# Patient Record
Sex: FEMALE | Race: WHITE
[De-identification: names, ages, dates, MRNs, and addresses within clinical notes are randomized per-mention and may not be internally consistent; named-entity substitution may affect disease eponyms.]

---

## 2018-08-17 ENCOUNTER — HOSPITAL ENCOUNTER (EMERGENCY)
Dept: HOSPITAL 25 - ED | Age: 28
Discharge: HOME | End: 2018-08-17
Payer: SELF-PAY

## 2018-08-17 VITALS — SYSTOLIC BLOOD PRESSURE: 121 MMHG | DIASTOLIC BLOOD PRESSURE: 60 MMHG
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
## 2018-08-17 DIAGNOSIS — M54.5: Primary | ICD-10-CM

## 2018-08-17 LAB
BASOPHILS # BLD AUTO: 0 10^3/UL (ref 0–0.2)
EOSINOPHIL # BLD AUTO: 0.1 10^3/UL (ref 0–0.6)
HCT VFR BLD AUTO: 36 % (ref 35–47)
HGB BLD-MCNC: 11.7 G/DL (ref 12–16)
LYMPHOCYTES # BLD AUTO: 1.3 10^3/UL (ref 1–4.8)
MCH RBC QN AUTO: 26 PG (ref 27–31)
MCHC RBC AUTO-ENTMCNC: 32 G/DL (ref 31–36)
MCV RBC AUTO: 82 FL (ref 80–97)
MONOCYTES # BLD AUTO: 0.4 10^3/UL (ref 0–0.8)
NEUTROPHILS # BLD AUTO: 2.7 10^3/UL (ref 1.5–7.7)
NRBC # BLD AUTO: 0 10^3/UL
NRBC BLD QL AUTO: 0.1
PLATELET # BLD AUTO: 263 10^3/UL (ref 150–450)
RBC # BLD AUTO: 4.44 10^6/UL (ref 4–5.4)
WBC # BLD AUTO: 4.5 10^3/UL (ref 3.5–10.8)

## 2018-08-17 PROCEDURE — 80053 COMPREHEN METABOLIC PANEL: CPT

## 2018-08-17 PROCEDURE — 72170 X-RAY EXAM OF PELVIS: CPT

## 2018-08-17 PROCEDURE — 72220 X-RAY EXAM SACRUM TAILBONE: CPT

## 2018-08-17 PROCEDURE — 99282 EMERGENCY DEPT VISIT SF MDM: CPT

## 2018-08-17 PROCEDURE — 84702 CHORIONIC GONADOTROPIN TEST: CPT

## 2018-08-17 PROCEDURE — 85025 COMPLETE CBC W/AUTO DIFF WBC: CPT

## 2018-08-17 PROCEDURE — 36415 COLL VENOUS BLD VENIPUNCTURE: CPT

## 2018-08-17 NOTE — ED
Back Pain





- HPI Summary


HPI Summary: 


[facility] attending Aileen Sharpe.





Patient is a 27 y/o F c/o back pain onset ~3 days ago s/p falling down stairs. 

Pain is localized in the lower back, worse on the sides and rated an 8/10, per 

triage. Assoc. Sx: lower back pain. Denies: fever. She reports having fallen 

down the stairs 3 days ago. She slid down 15 stairs in a seated position, her 

coccyx taking the majority of the trauma. She is unable to sit on her tail bone 

secondary to pain. She reports using ice to alleviate the swelling. LNMP: 2 

weeks ago. Patient has a Hx of Von Willebrand disease.





I, Dr. Sharpe, personally performed the services described in this documentation 

as scribed in my presence and it is both accurate and complete.





- History of Current Complaint


Chief Complaint: EDBackInjuryPain


Stated Complaint: BACK INJURY


Time Seen by Provider: 08/17/18 13:28


Hx Obtained From: Patient


Onset/Duration: Sudden Onset, Lasting Days, Still Present


Onset/Duration: Started Days Ago


Timing: Constant


Back Pain Location: Is Diffuse - lower back


Severity Initially: Severe


Severity Currently: Severe


Pain Intensity: 8


Pain Scale Used: 0-10 Numeric


Alleviating Symptom(s): Other - POS: ice


Associated Signs And Symptoms: Negative: Fever





- Allergies/Home Medications


Allergies/Adverse Reactions: 


 Allergies











Allergy/AdvReac Type Severity Reaction Status Date / Time


 


No Known Allergies Allergy   Verified 08/17/18 13:20











Home Medications: 


 Home Medications





NK [No Home Medications Reported]  08/17/18 [History Confirmed 08/17/18]











PMH/Surg Hx/FS Hx/Imm Hx


Endocrine/Hematology History: 


   Denies: Hx Diabetes


Cardiovascular History: 


   Denies: Hx Coronary Artery Disease, Hx Hypertension


Infectious Disease History: No


Infectious Disease History: 


   Denies: Traveled Outside the [place] in Last 30 Days





- Family History


Known Family History: Positive: Cardiac Disease


   Negative: Hypertension, Diabetes





- Social History


Occupation: Employed Full-time


Lives: With Family


Alcohol Use: Occasionally


Hx Substance Use: No


Substance Use Type: Reports: None


Hx Tobacco Use: No


Smoking Status (MU): Never Smoked Tobacco





Review of Systems


Negative: Fever


Positive: Other - POS: lower back pain


All Other Systems Reviewed And Are Negative: Yes





Physical Exam





- Summary


Physical Exam Summary: 


VITAL SIGNS: Reviewed.


GENERAL: Patient is a well-developed and nourished Female who is lying 

comfortable in the stretcher. Patient is not in any acute respiratory distress.


HEAD AND FACE: No signs of trauma. No ecchymosis, hematomas or skull 

depressions. No sinus tenderness.


EYES: PERRLA, EOMI x 2, No injected conjunctiva, no nystagmus.


EARS: Hearing grossly intact. Ear canals and tympanic membranes are within 

normal limits.


MOUTH: Oropharynx within normal limits.


NECK: Supple, trachea is midline, no adenopathy, no JVD, no carotid bruit, no c-

spine tenderness, neck with full ROM.


CHEST: Symmetric, no tenderness at palpation


LUNGS: Clear to auscultation bilaterally. No wheezing or crackles.


CVS: Regular rate and rhythm, S1 and S2 present, no murmurs or gallops 

appreciated.


ABDOMEN: Soft, non-tender. No signs of distention. No rebound no guarding, and 

no masses palpated. Bowel sounds are normal.


EXTREMITIES: FROM in all major joints, no edema, no cyanosis or clubbing.


NEURO: Alert and oriented x 3. No acute neurological deficits. Speech is normal 

and follows commands.


SKIN: Dry and warm


Back: Tenderness along the sacrum.


Triage Information Reviewed: Yes


Vital Signs On Initial Exam: 


 Initial Vitals











Temp Pulse Resp BP Pulse Ox


 


 98.2 F   76   16   115/71   100 


 


 08/17/18 13:17  08/17/18 13:17  08/17/18 13:17  08/17/18 13:17  08/17/18 13:17











Vital Signs Reviewed: Yes





Diagnostics





- Vital Signs


 Vital Signs











  Temp Pulse Resp BP Pulse Ox


 


 08/17/18 13:17  98.2 F  76  16  115/71  100














- Laboratory


Result Diagrams: 


 08/17/18 13:42





 08/17/18 13:42


Lab Statement: Any lab studies that have been ordered have been reviewed, and 

results considered in the medical decision making process.





- Radiology


  ** Pelvis XR


Xray Interpretation: No Acute Changes - IMPRESSION: NO ACUTE OSSEOUS INJURY. IF 

SYMPTOMS PERSIST, RECOMMEND REPEAT IMAGING.


Radiology Interpretation Completed By: Radiologist - Report has been reviewed 

by provider and radiologist.





  ** Sacrum/coccyx XR


Xray Interpretation: No Acute Changes - IMPRESSION:  NO ACUTE OSSEOUS INJURY OF 

THE SACRUM AND COCCYX. PLAIN FILMS ARE RELATIVELY INSENSITIVE TO NONDISPLACED 

FRACTURES OF THE SACRUM AND COCCYX. IF THERE IS PERSISTENT CLINICAL CONCERN FOR 

SACROCOCCYGEAL OSSEOUS PATHOLOGY, BONE SCANNING MAY BE MORE SENSITIVE


Radiology Interpretation Completed By: Radiologist - Report has been reviewed 

by provider and radiologist.





Back Pain Course/Dx





- Course


Assessment/Plan: This patient is a 28-year-old female who presents to the 

emergency room with a chief complaint of pain in the lower back after the 

patient fell 3 days ago when she was intoxicated.  She reports that when she 

felt there was no much pain however as the days went by the pain has increased 

to approximately 5 out of 10.  The patient reports that she is unable to lie 

her back and she is unable to sit because of the pain.  Patient has past 

medical history of von Willebrand's disease however she hasn't seen any 

ecchymosis, any bleeding per rectum or vagina.  Patient is able to ambulate.  

Blood work without any significant abnormality, glucose is 64. The patient was 

given something to eat since the shortness low. Pregnancy test is negative.  

Sacrum x-ray impression: No acute osseous injury of the sacrum and coccyx.  

Pelvic x-ray impression: No acute osseous injury.  At this point I offered the 

patient and pelvic CT however the patient declined. The patient reports that 

the pain is not severe at this time.  I discussed all the findings and test 

results with the patient. Patient was instructed to return to the emergency 

room immediately if any of the symptoms return or worsens. Plan of care was 

discussed with the patient and understands and agrees. All questions were 

answered at patient satisfaction.  There were no further complaints or 

concerns.  Lung exam before discharge: CTA B/L. Good air exchange. No wheezing 

or crackles heard. CVS: S1 and S2 present. No murmurs appreciated. Patient is 

alert and oriented x 3. Patient is hemodynamically stable. Patient will be 

discharged home with follow up PCP in the next 2-3 days





- Diagnoses


Provider Diagnoses: 


 Lumbosacral pain








Discharge





- Sign-Out/Discharge


Documenting (check all that apply): Patient Departure





- Discharge Plan


Condition: Stable


Disposition: HOME


Patient Education Materials:  Back Pain (ED)


[place]: 


[facility] [place]


Additional Instructions: 


TAKE TYLENOL TO RELIEVE PAIN


RETURN TO THE ED FOR ANY WORSENING OR NEW SYMPTOMS.





- Billing Disposition and Condition


Condition: STABLE


Disposition: Home





Attestations


Scribe Attestation: 


I, Dr. Sharpe personally performed the services described in this documentation 

as scribed in my presence and it is both accurate and complete.


User Type: Provider with Scribe


Provider Attestation: The documentation recorded by the scribe accurately 

reflects the service I personally performed and the decisions made by me.

## 2018-08-17 NOTE — RAD
HISTORY: pain, tailbone pain, injury



COMPARISONS: None



VIEWS: 1, Single frontal view of the pelvis



FINDINGS:



BONE DENSITY: Normal.

BONES: There is no displaced fracture. The sacral arches are intact.

JOINTS: There is no arthropathy.

ALIGNMENT: There is no dislocation. 

SOFT TISSUES: Unremarkable.



OTHER FINDINGS: An IUD is noted..



IMPRESSION: 

NO ACUTE OSSEOUS INJURY. IF SYMPTOMS PERSIST, RECOMMEND REPEAT IMAGING.

## 2018-08-17 NOTE — RAD
HISTORY: pain, tailbone injury



COMPARISONS: None



VIEWS: 3, frontal, lateral, and axial views of the sacrum and coccyx



FINDINGS:



BONE DENSITY: Normal.

BONES: There is no displaced fracture. The sacral arches are intact.

JOINTS: There is no arthropathy.

ALIGNMENT: There is no dislocation. 

SOFT TISSUES: Unremarkable.



OTHER FINDINGS: An IUD is noted..



IMPRESSION: 

NO ACUTE OSSEOUS INJURY OF THE SACRUM AND COCCYX. PLAIN FILMS ARE RELATIVELY INSENSITIVE

TO NONDISPLACED FRACTURES OF THE SACRUM AND COCCYX. IF THERE IS PERSISTENT CLINICAL

CONCERN FOR SACROCOCCYGEAL OSSEOUS PATHOLOGY, BONE SCANNING MAY BE MORE SENSITIVE